# Patient Record
Sex: MALE | Race: WHITE
[De-identification: names, ages, dates, MRNs, and addresses within clinical notes are randomized per-mention and may not be internally consistent; named-entity substitution may affect disease eponyms.]

---

## 2017-09-18 NOTE — EDPHY
H & P


Stated Complaint: increased swelling to L ankle/lower leg--dx 9/4 L dvt--did 

not take xarelto


Time Seen by Provider: 09/18/17 15:57


HPI/ROS: 





Chief complaint:  Left leg DVT, worsening symptoms





History of present illness:  This is a 57-year-old male who presents to the 

emergency department reporting his left leg DVT appears to be worsening.  

Patient states he was diagnosed with a DVT at [facility] on 

September 4th, approximately 2 weeks ago.  He was started that evening on 

Xarelto.  He was given a prescription.  However when he went to fill it, it was 

cost prohibitive, he states it cost $1000 dollars and he cannot afford this.  

Therefore he did not take the medication.  Instead he has been taking aspirin 

on occasion.  He has noted some bruising to the inner aspect of his ankle.  He 

believes the DVT was precipitated from spending 4 days in snf where he was not 

allowed to exercise. He denies other associated signs or symptoms including no 

abnormal coolness and no paresthesias to the leg.  Further he denies chest pain

, shortness of breath, cough.





Review of systems:  A 10 point review of systems was obtained and other than 

described above negative.





- Personal History


Current Tetanus/Diphtheria Vaccine: Unsure


Current Tetanus Diphtheria and Acellular Pertussis (TDAP): Unsure





- Medical/Surgical History


Hx Asthma: No


Hx Chronic Respiratory Disease: No


Hx Diabetes: No


Hx Cardiac Disease: No


Hx Renal Disease: No


Hx Cirrhosis: No


Hx Alcoholism: No


Hx HIV/AIDS: No


Hx Splenectomy or Spleen Trauma: No


Other PMH: bipolar.  L ankle fracture 2003





- Social History


Smoking Status: Light smoker





- Physical Exam


Exam: 





General Appearance: Alert, nontoxic.


Eyes: Pupils equal and round no injection.


Respiratory: Chest is non tender, lungs are clear to auscultation.


Cardiovascular: Regular rate and rhythm.  DP and PT pulses 2+.


Gastrointestinal:  Abdomen is soft and non tender, no masses, bowel sounds 

normal.


Musculoskeletal:  There is a contusion to the medial aspect of the left ankle.  

Otherwise there is no erythema or edema or asymmetry of the rest of the legs.


Skin:  No rashes or lesions.


Neurological alert and oriented x4.  Strength and sensation intact and 

symmetrical.





Constitutional: 


 Initial Vital Signs











Temperature (C)  36.8 C   09/18/17 15:18


 


Heart Rate  81   09/18/17 15:18


 


Respiratory Rate  16   09/18/17 15:18


 


Blood Pressure  134/80 H  09/18/17 15:18


 


O2 Sat (%)  98   09/18/17 15:18








 











O2 Delivery Mode               Room Air














Allergies/Adverse Reactions: 


 





No Known Allergies Allergy (Verified 09/27/13 20:32)


 








Home Medications: 














 Medication  Instructions  Recorded


 


Truvada 100 mg-150 mg Tablet  09/18/17


 


traMADol [Ultram 50 mg (*)] 50 mg PO Q6 #10 tab 09/18/17














Medical Decision Making





- Diagnostics


Imaging Results: 


 Imaging Impressions





Extremity Venous Study  09/18/17 16:11


Impression: There is no deep or superficial vein thrombosis identified in the 

left lower extremity on today's sonogram. 


 


Findings were discussed with Kvng Ojeda PA-C at 16:48, on 9/18/2017.


 











Imaging: Discussed imaging studies w/ On call Radiologist


ED Course/Re-evaluation: 





Patient was discussed with my primary supervising physician Dr. Cristina Dwyer.  

Patient presents to the emergency department concerned he has a DVT that is 

worsening.  His left leg is neurovascularly intact.  Ultrasound obtained from 

[facility] shows patient had a partially occlusive left popliteal 

vein thrombosis on 9/4/2017.  Ultrasound today is negative.  No evidence of PE 

as vital signs are stable and he has no complaints such as chest pain or 

shortness of breath. I do not believe patient needs to be restarted on 

anticoagulants at this time.  However I have discussed the importance of close 

follow-up with his primary care doctor for recheck this week.  He is further 

instructed that if symptoms return or new symptoms develop return to the 

emergency room.


Differential Diagnosis: 





Included but not limited to superficial thrombophlebitis, DVT, unlikely 

arterial occlusion, cellulitis or other traumatic injury





Departure





- Departure


Disposition: Home, Routine, Self-Care


Clinical Impression: 


 Left ankle swelling





Condition: Good


Instructions:  Deep Venous Thrombosis (ED)


Additional Instructions: 


Follow-up with your primary care doctor this week for recheck





If symptoms return or new symptoms develop please seek care for re-evaluation 

for possible DVT


Referrals: 


Zoltan Olivera DO [Primary Care Provider] - As per Instructions


Stand Alone Forms:  Work Limited Duty


Prescriptions: 


traMADol [Ultram 50 mg (*)] 50 mg PO Q6 #10 tab

## 2018-05-25 ENCOUNTER — HOSPITAL ENCOUNTER (EMERGENCY)
Dept: HOSPITAL 80 - FED | Age: 58
Discharge: HOME | End: 2018-05-25
Payer: COMMERCIAL

## 2018-05-25 VITALS — SYSTOLIC BLOOD PRESSURE: 136 MMHG | DIASTOLIC BLOOD PRESSURE: 96 MMHG
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
## 2018-05-25 DIAGNOSIS — S69.91XA: Primary | ICD-10-CM

## 2018-05-25 LAB
CK SERPL-CCNC: 175 IU/L (ref 0–224)
PLATELET # BLD: 286 10^3/UL (ref 150–400)

## 2018-05-25 NOTE — EDPHY
H & P


Smoking Status: Light smoker


Time Seen by Provider: 05/25/18 13:37


HPI/ROS: 





CHIEF COMPLAINT:  Right hand and arm pain for several weeks





HISTORY OF PRESENT ILLNESS:  57-year-old right-hand-dominant male states that 

on May 10th he sustained a mechanical fall injuring his right hand.  He 

subsequently seen at multiple urgent cares, twice at the [facility] Emergency 

Department and once by Dr Man for complaints of progressive right hand pain

, swelling, decreased range of motion specific etiology of which is 

incompletely clear.  He is concerned about possible upper extremity DVT.  

Denies history of IV drug use.  Denies discoloration.  Denies chest pain.  

Denies abdominal pain.  Denies dyspnea.











REVIEW OF SYSTEMS:


A ten point review of systems was performed and is negative with the exception 

of the items mentioned in the HPI








PAST MEDICAL & SURGICAL  HISTORY:  On prophylactic Truvada





SOCIAL HISTORY: no IV drug use    














************


PHYSICAL EXAM





(Prior to examination, patient consented to physical exam, hands were washed 

and my usual and customary physical exam procedures followed)


1) GENERAL: Well-developed, well-nourished, alert and oriented.  Appears to be 

in no acute distress.


2) HEAD: Normocephalic, atraumatic


3) HEENT: Pupils equal, round, reactive to light bilaterally.  Sclera 

anicteric. 


4) NECK: Full range of motion, no meningeal signs.


5) LUNGS: Clear auscultation bilaterally, no wheezes, no rhonchi, no 

retractions.   


6) HEART: Regular rate and rhythm, no murmur, no heave, no gallop.


7) ABDOMEN: No guarding, no rebound, no focal tenderness,,


8) MUSCULOSKELETAL:  Right upper extremity:  Notable asymmetry of the right hand

, notably significant soft tissue swelling, induration of the right hand 

specifically the thenar hypothenar eminence.  He is unable to oppose thumb and 

pinky digit secondary to pain.  No crepitus.  No erythema.  Brisk capillary 

refill.  Negative kanavel


9) BACK: , no visual or palpable abnormality. 


10) SKIN: No rash, no petechiae. 


11) Psychiatric:  Patient is oriented X 3, there is no agitation.








***************





DIFFERENTIAL DIAGNOSIS:  In no particular include but limited to septic 

arthritis, gouty arthritis, abscess, deep space infection  


 (ELLIE Rodriguez)


Constitutional: 


 Initial Vital Signs











Temperature (C)  36.7 C   05/25/18 13:06


 


Heart Rate  94   05/25/18 13:06


 


Respiratory Rate  17   05/25/18 13:06


 


Blood Pressure  125/79 H  05/25/18 13:06


 


O2 Sat (%)  95   05/25/18 13:06








 











O2 Delivery Mode               Room Air














Allergies/Adverse Reactions: 


 





No Known Allergies Allergy (Verified 05/25/18 13:05)


 








Home Medications: 














 Medication  Instructions  Recorded


 


Truvada 100 mg-150 mg Tablet  09/18/17


 


oxyCODONE/APAP 5/325 [Percocet 1 tab PO Q6 #10 tab 05/25/18





5/325]  














MDM/Departure





- MDM


Imaging: Discussed imaging studies w/ On call Radiologist





- MDM


Imaging Results: 


Images reviewed myself (ELLIE Rodriguez)


Procedures: 





Procedure:  Splint 





A Velcro thumb spica splint was applied by ER technician.   After application 

of the splint I returned and re-examined the patient.  The splint was 

adequately immobilizing the joint and distal to the splint the patient's 

circulation and sensation were intact.  Patient shows no signs of compartment 

syndrome.  Was given orthopedic precautions. (ELLIE Rodriguez)


ED Course/Re-evaluation: 





The patient was evaluated and managed by the physician assistant.  I have 

reviewed this chart and I agree with the findings and plan of care as documented

, as indicated by my signature.  I am the secondary supervising physician. (

Jessy Diaz)





I reviewed the patient's medical records including emergency department visit 

from [facility] 5 days ago.  The patient had x-rays performed at that time 

which were negative.  Will hold on x-rays at this time.  I discussed the case 

with secondary supervising physician Dr. Jessy Diaz in the ER.  Patient was 

re-examined with serial examinations.  Discussed the negative DVT.  The patient 

was also seen examined by on-call hand surgery Dr. Macho Moyer at 1530 hr in 

the emergency department who thinks that the patient's symptoms are less than 

likely secondary to acute infectious etiology such as cellulitis, deep space 

infection, septic arthritis.  Plan will be discharge with thumb spica splint, 

outpatient follow-up, analgesia, elevation.  Patient feels comfortable with 

this plan.  Usual and customary discharge precautions instructions provided. (

ELLIE Rodriguez)





- Depart


Disposition: Home, Routine, Self-Care


Clinical Impression: 


 Right hand pain





Condition: Good


Instructions:  Arthralgia (ED)


Additional Instructions: 


Return to the ER immediately if you experience discoloration, have worsening 

pain, numbness, tingling, or any other symptoms that concern you.  If you 

received x-rays in the emergency department today, be advised, that ligamentous

, tendon, muscular, and other non-bony injury cannot be fully ruled out.


Stand Alone Forms:  Work Excuse


Prescriptions: 


oxyCODONE/APAP 5/325 [Percocet 5/325] 1 tab PO Q6 #10 tab


Referrals: 


Zoltan Olivera DO [Primary Care Provider] - As per Instructions


Macho Moyer MD [Medical Doctor] - 5-7 days, call for appt.

## 2023-06-26 ENCOUNTER — OFFICE (OUTPATIENT)
Dept: URBAN - METROPOLITAN AREA CLINIC 19 | Facility: CLINIC | Age: 63
End: 2023-06-26

## 2023-06-26 VITALS
HEART RATE: 75 BPM | DIASTOLIC BLOOD PRESSURE: 90 MMHG | SYSTOLIC BLOOD PRESSURE: 125 MMHG | OXYGEN SATURATION: 98 % | WEIGHT: 225 LBS | HEIGHT: 72 IN

## 2023-06-26 DIAGNOSIS — Z87.19 PERSONAL HISTORY OF OTHER DISEASES OF THE DIGESTIVE SYSTEM: ICD-10-CM

## 2023-06-26 DIAGNOSIS — R13.10 DYSPHAGIA, UNSPECIFIED: ICD-10-CM

## 2023-06-26 PROCEDURE — 99204 OFFICE O/P NEW MOD 45 MIN: CPT

## 2023-06-26 NOTE — SERVICENOTES
The patient's assessment was reviewed with Dr. Alfaro and a collaborative plan of care was established.

## 2023-06-26 NOTE — SERVICEHPINOTES
62-year-old  white male recently moved to [place] from [place], [place] referred for recurrent dysphagia.  In December of last year he experienced an episode of meat getting lodged in his esophagus.  He was eventually able to vomit it out, but had another episode of this in March of this last year in [place] where he went to the ED.  The meat bolus passed on its own and he underwent an EGD and colonoscopy 5/2/23 at [facility] in [place], [place].  The colonoscopy was normal, but his esophagus had an esophageal stricture which was dilated.  He has had significant relief in symptoms since then, but last week ate a piece of steak that got lodged in his esophagus again.  It eventually passed, but he feels he likely needs esophagus stretched again.  He denies reflux, heartburn, nausea, vomiting, abdominal pain, change in bowel habits or overt GI bleeding.  He is adopted and does not have knowledge of his family medical history.